# Patient Record
Sex: FEMALE | Race: WHITE
[De-identification: names, ages, dates, MRNs, and addresses within clinical notes are randomized per-mention and may not be internally consistent; named-entity substitution may affect disease eponyms.]

---

## 2008-10-27 VITALS
DIASTOLIC BLOOD PRESSURE: 76 MMHG | SYSTOLIC BLOOD PRESSURE: 100 MMHG | SYSTOLIC BLOOD PRESSURE: 100 MMHG | SYSTOLIC BLOOD PRESSURE: 152.4 MMHG | DIASTOLIC BLOOD PRESSURE: 76 MMHG

## 2018-02-27 ENCOUNTER — HOSPITAL ENCOUNTER (OUTPATIENT)
Dept: HOSPITAL 6 - RAD | Age: 68
End: 2018-02-27
Payer: MEDICARE

## 2018-02-27 DIAGNOSIS — M25.775: ICD-10-CM

## 2018-02-27 DIAGNOSIS — M20.12: Primary | ICD-10-CM

## 2019-04-18 ENCOUNTER — HOSPITAL ENCOUNTER (EMERGENCY)
Dept: HOSPITAL 19 - COL.ER | Age: 69
Discharge: HOME | End: 2019-04-18
Payer: MEDICARE

## 2019-04-18 VITALS — DIASTOLIC BLOOD PRESSURE: 99 MMHG | SYSTOLIC BLOOD PRESSURE: 129 MMHG | HEART RATE: 88 BPM

## 2019-04-18 VITALS — WEIGHT: 118.17 LBS | BODY MASS INDEX: 23.2 KG/M2 | HEIGHT: 60 IN

## 2019-04-18 VITALS — TEMPERATURE: 97.9 F

## 2019-04-18 DIAGNOSIS — M48.56XG: ICD-10-CM

## 2019-04-18 DIAGNOSIS — M06.9: ICD-10-CM

## 2019-04-18 DIAGNOSIS — N12: Primary | ICD-10-CM

## 2019-04-18 DIAGNOSIS — Z98.890: ICD-10-CM

## 2019-04-18 DIAGNOSIS — Z79.51: ICD-10-CM

## 2019-04-18 LAB
ALBUMIN SERPL-MCNC: 4 GM/DL (ref 3.5–5)
ALP SERPL-CCNC: 123 U/L (ref 50–136)
ALT SERPL-CCNC: 13 U/L (ref 9–52)
ANION GAP SERPL CALC-SCNC: 9 MMOL/L (ref 7–16)
AST SERPL-CCNC: 44 U/L (ref 15–37)
BASOPHILS # BLD: 0.1 10*3/UL (ref 0–0.2)
BASOPHILS NFR BLD AUTO: 1.2 % (ref 0–2)
BILIRUB SERPL-MCNC: 0.4 MG/DL (ref 0–1)
BUN SERPL-MCNC: 18 MG/DL (ref 7–17)
CALCIUM SERPL-MCNC: 8.9 MG/DL (ref 8.4–10.2)
CHLORIDE SERPL-SCNC: 98 MMOL/L (ref 98–107)
CO2 SERPL-SCNC: 30 MMOL/L (ref 22–30)
CREAT SERPL-SCNC: 0.58 UMOL/L (ref 0.52–1.25)
EOSINOPHIL # BLD: 0.2 10*3/UL (ref 0–0.7)
EOSINOPHIL NFR BLD: 4 % (ref 0–4)
ERYTHROCYTE [DISTWIDTH] IN BLOOD BY AUTOMATED COUNT: 15.9 % (ref 11.5–14.5)
GLUCOSE SERPL-MCNC: 84 MG/DL (ref 74–106)
GRANULOCYTES # BLD AUTO: 55.2 % (ref 42.2–75.2)
HCT VFR BLD AUTO: 42.8 % (ref 37–47)
HGB BLD-MCNC: 13.7 G/DL (ref 12.5–16)
LIPASE SERPL-CCNC: 40 U/L (ref 23–300)
LYMPHOCYTES # BLD: 1.3 10*3/UL (ref 1.2–3.4)
LYMPHOCYTES NFR BLD: 26 % (ref 20–51)
MCH RBC QN AUTO: 27 PG (ref 27–31)
MCHC RBC AUTO-ENTMCNC: 32 G/DL (ref 33–37)
MCV RBC AUTO: 84 FL (ref 80–100)
MONOCYTES # BLD: 0.7 10*3/UL (ref 0.1–0.6)
MONOCYTES NFR BLD AUTO: 13.4 % (ref 1.7–9.3)
NEUTROPHILS # BLD: 2.8 10*3/UL (ref 1.4–6.5)
PH UR STRIP.AUTO: 5 [PH] (ref 5–8)
PLATELET # BLD AUTO: 312 K/MM3 (ref 130–400)
PMV BLD AUTO: 9.2 FL (ref 7.4–10.4)
POTASSIUM SERPL-SCNC: 3.1 MMOL/L (ref 3.4–5)
PROT SERPL-MCNC: 7.9 GM/DL (ref 6.4–8.2)
RBC # BLD AUTO: 5.1 M/MM3 (ref 4.1–5.3)
RBC # UR: (no result) /HPF
SODIUM SERPL-SCNC: 137 MMOL/L (ref 137–145)
SP GR UR STRIP.AUTO: 1.01 (ref 1–1.03)
SQUAMOUS # URNS: (no result) /HPF
URINE LEUKOCYTE ESTERASE: (no result)
URN COLLECT METHOD CLASS: (no result)

## 2019-04-18 PROCEDURE — A4216 STERILE WATER/SALINE, 10 ML: HCPCS

## 2019-05-06 ENCOUNTER — HOSPITAL ENCOUNTER (EMERGENCY)
Dept: HOSPITAL 19 - COL.ER | Age: 69
Discharge: HOME | End: 2019-05-06
Payer: MEDICARE

## 2019-05-06 VITALS — HEART RATE: 92 BPM | SYSTOLIC BLOOD PRESSURE: 130 MMHG | DIASTOLIC BLOOD PRESSURE: 93 MMHG

## 2019-05-06 VITALS — BODY MASS INDEX: 22.81 KG/M2 | WEIGHT: 116.18 LBS | HEIGHT: 60 IN

## 2019-05-06 VITALS — TEMPERATURE: 97 F

## 2019-05-06 DIAGNOSIS — N39.0: Primary | ICD-10-CM

## 2019-05-06 DIAGNOSIS — I10: ICD-10-CM

## 2019-05-06 LAB
ALBUMIN SERPL-MCNC: 4.1 GM/DL (ref 3.5–5)
ALP SERPL-CCNC: 130 U/L (ref 50–136)
ALT SERPL-CCNC: < 6 U/L (ref 9–52)
ANION GAP SERPL CALC-SCNC: 15 MMOL/L (ref 7–16)
AST SERPL-CCNC: 51 U/L (ref 15–37)
BASOPHILS # BLD: 0.1 10*3/UL (ref 0–0.2)
BASOPHILS NFR BLD AUTO: 1.1 % (ref 0–2)
BILIRUB SERPL-MCNC: 0.4 MG/DL (ref 0–1)
BUN SERPL-MCNC: 18 MG/DL (ref 7–17)
CALCIUM SERPL-MCNC: 9.3 MG/DL (ref 8.4–10.2)
CHLORIDE SERPL-SCNC: 100 MMOL/L (ref 98–107)
CO2 SERPL-SCNC: 28 MMOL/L (ref 22–30)
CREAT SERPL-SCNC: 0.58 UMOL/L (ref 0.52–1.25)
EOSINOPHIL # BLD: 0.1 10*3/UL (ref 0–0.7)
EOSINOPHIL NFR BLD: 1.5 % (ref 0–4)
ERYTHROCYTE [DISTWIDTH] IN BLOOD BY AUTOMATED COUNT: 15.9 % (ref 11.5–14.5)
GLUCOSE SERPL-MCNC: 96 MG/DL (ref 74–106)
GRANULOCYTES # BLD AUTO: 56.6 % (ref 42.2–75.2)
HCT VFR BLD AUTO: 45.7 % (ref 37–47)
HGB BLD-MCNC: 14.8 G/DL (ref 12.5–16)
HYALINE CASTS #/AREA URNS LPF: (no result) /LPF
KETONES UR STRIP.AUTO-MCNC: (no result) MG/DL
LYMPHOCYTES # BLD: 1.7 10*3/UL (ref 1.2–3.4)
LYMPHOCYTES NFR BLD: 26.6 % (ref 20–51)
MCH RBC QN AUTO: 27 PG (ref 27–31)
MCHC RBC AUTO-ENTMCNC: 32 G/DL (ref 33–37)
MCV RBC AUTO: 84 FL (ref 80–100)
MONOCYTES # BLD: 0.9 10*3/UL (ref 0.1–0.6)
MONOCYTES NFR BLD AUTO: 13.9 % (ref 1.7–9.3)
NEUTROPHILS # BLD: 3.7 10*3/UL (ref 1.4–6.5)
PH UR STRIP.AUTO: 5 [PH] (ref 5–8)
PLATELET # BLD AUTO: 420 K/MM3 (ref 130–400)
PMV BLD AUTO: 9.4 FL (ref 7.4–10.4)
POTASSIUM SERPL-SCNC: 3 MMOL/L (ref 3.4–5)
PROT SERPL-MCNC: 8.4 GM/DL (ref 6.4–8.2)
RBC # BLD AUTO: 5.46 M/MM3 (ref 4.1–5.3)
RBC # UR: (no result) /HPF
SODIUM SERPL-SCNC: 143 MMOL/L (ref 137–145)
SP GR UR STRIP.AUTO: 1.01 (ref 1–1.03)
SQUAMOUS # URNS: (no result) /HPF
URINE LEUKOCYTE ESTERASE: (no result)
URN COLLECT METHOD CLASS: (no result)

## 2020-01-17 ENCOUNTER — HOSPITAL ENCOUNTER (OUTPATIENT)
Dept: HOSPITAL 19 - ZCOL.LAB | Age: 70
End: 2020-01-17
Attending: NURSE PRACTITIONER
Payer: MEDICARE

## 2020-01-17 DIAGNOSIS — S81.831A: Primary | ICD-10-CM

## 2020-02-28 ENCOUNTER — HOSPITAL ENCOUNTER (OUTPATIENT)
Dept: HOSPITAL 19 - ZCOL.LAB | Age: 70
End: 2020-02-28
Attending: NURSE PRACTITIONER
Payer: MEDICARE

## 2020-02-28 DIAGNOSIS — L53.9: Primary | ICD-10-CM

## 2020-06-26 ENCOUNTER — HOSPITAL ENCOUNTER (OUTPATIENT)
Dept: HOSPITAL 19 - ZCOL.LAB | Age: 70
End: 2020-06-26
Attending: NURSE PRACTITIONER
Payer: MEDICARE
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
## 2020-06-26 DIAGNOSIS — L03.90: Primary | ICD-10-CM

## 2020-09-18 ENCOUNTER — HOSPITAL ENCOUNTER (OUTPATIENT)
Dept: HOSPITAL 19 - ZCOL.LAB | Age: 70
End: 2020-09-18
Attending: NURSE PRACTITIONER
Payer: MEDICARE

## 2020-09-18 DIAGNOSIS — S81.831A: Primary | ICD-10-CM

## 2020-09-21 ENCOUNTER — HOSPITAL ENCOUNTER (OUTPATIENT)
Dept: HOSPITAL 19 - COL.LAB | Age: 70
End: 2020-09-21
Attending: INTERNAL MEDICINE
Payer: MEDICARE

## 2020-09-21 DIAGNOSIS — L97.909: Primary | ICD-10-CM

## 2020-09-21 DIAGNOSIS — T14.8XXD: ICD-10-CM

## 2020-09-21 DIAGNOSIS — S81.831A: ICD-10-CM

## 2020-09-21 DIAGNOSIS — L03.90: ICD-10-CM

## 2020-09-21 DIAGNOSIS — S91.051A: ICD-10-CM

## 2020-09-21 LAB
ALBUMIN SERPL-MCNC: 4.3 GM/DL (ref 3.5–5)
ALP SERPL-CCNC: 112 U/L (ref 50–136)
ALT SERPL-CCNC: 16 U/L (ref 4–34)
ANION GAP SERPL CALC-SCNC: 6 MMOL/L (ref 7–16)
AST SERPL-CCNC: 42 U/L (ref 15–37)
BASOPHILS # BLD: 0 10*3/UL (ref 0–0.2)
BASOPHILS NFR BLD AUTO: 0.5 % (ref 0–2)
BILIRUB SERPL-MCNC: 0.4 MG/DL (ref 0–1)
BUN SERPL-MCNC: 14 MG/DL (ref 7–17)
CALCIUM SERPL-MCNC: 9.5 MG/DL (ref 8.4–10.2)
CHLORIDE SERPL-SCNC: 99 MMOL/L (ref 98–107)
CO2 SERPL-SCNC: 34 MMOL/L (ref 22–30)
CREAT SERPL-SCNC: 0.63 UMOL/L (ref 0.52–1.25)
CRP SERPL-MCNC: 1.6 MG/DL (ref 0–0.9)
EOSINOPHIL # BLD: 0.2 10*3/UL (ref 0–0.7)
EOSINOPHIL NFR BLD: 2 % (ref 0–4)
ERYTHROCYTE [DISTWIDTH] IN BLOOD BY AUTOMATED COUNT: 15.7 % (ref 11.5–14.5)
ERYTHROCYTE [SEDIMENTATION RATE] IN BLOOD: 14 MM/HR (ref 0–30)
GLUCOSE SERPL-MCNC: 93 MG/DL (ref 74–106)
GRANULOCYTES # BLD AUTO: 69.4 % (ref 42.2–75.2)
HCT VFR BLD AUTO: 43 % (ref 37–47)
HGB BLD-MCNC: 13.7 G/DL (ref 12.5–16)
LYMPHOCYTES # BLD: 1.3 10*3/UL (ref 1.2–3.4)
LYMPHOCYTES NFR BLD: 17.3 % (ref 20–51)
MCH RBC QN AUTO: 27 PG (ref 27–31)
MCHC RBC AUTO-ENTMCNC: 32 G/DL (ref 33–37)
MCV RBC AUTO: 85 FL (ref 80–100)
MONOCYTES # BLD: 0.8 10*3/UL (ref 0.1–0.6)
MONOCYTES NFR BLD AUTO: 10.5 % (ref 1.7–9.3)
NEUTROPHILS # BLD: 5.2 10*3/UL (ref 1.4–6.5)
PLATELET # BLD AUTO: 363 K/MM3 (ref 130–400)
PMV BLD AUTO: 9.9 FL (ref 7.4–10.4)
POTASSIUM SERPL-SCNC: 3.1 MMOL/L (ref 3.4–5)
PROT SERPL-MCNC: 8.7 GM/DL (ref 6.4–8.2)
RBC # BLD AUTO: 5.09 M/MM3 (ref 4.1–5.3)
SODIUM SERPL-SCNC: 140 MMOL/L (ref 137–145)

## 2020-12-08 ENCOUNTER — HOSPITAL ENCOUNTER (INPATIENT)
Dept: HOSPITAL 19 - COL.ER | Age: 70
LOS: 4 days | Discharge: NURSING, REHAB OR LONG-TERM CARE | DRG: 481 | End: 2020-12-12
Attending: INTERNAL MEDICINE | Admitting: EMERGENCY MEDICINE
Payer: MEDICARE

## 2020-12-08 VITALS — SYSTOLIC BLOOD PRESSURE: 137 MMHG | DIASTOLIC BLOOD PRESSURE: 83 MMHG | HEART RATE: 83 BPM | TEMPERATURE: 97.6 F

## 2020-12-08 VITALS — WEIGHT: 110.23 LBS | HEIGHT: 60 IN | BODY MASS INDEX: 21.64 KG/M2

## 2020-12-08 VITALS — HEART RATE: 79 BPM | DIASTOLIC BLOOD PRESSURE: 80 MMHG | SYSTOLIC BLOOD PRESSURE: 123 MMHG | TEMPERATURE: 98 F

## 2020-12-08 VITALS — SYSTOLIC BLOOD PRESSURE: 127 MMHG | HEART RATE: 87 BPM | TEMPERATURE: 98.2 F | DIASTOLIC BLOOD PRESSURE: 75 MMHG

## 2020-12-08 VITALS — DIASTOLIC BLOOD PRESSURE: 84 MMHG | SYSTOLIC BLOOD PRESSURE: 121 MMHG | HEART RATE: 93 BPM | TEMPERATURE: 98.4 F

## 2020-12-08 DIAGNOSIS — S72.141A: Primary | ICD-10-CM

## 2020-12-08 DIAGNOSIS — Z96.652: ICD-10-CM

## 2020-12-08 DIAGNOSIS — E87.1: ICD-10-CM

## 2020-12-08 DIAGNOSIS — I10: ICD-10-CM

## 2020-12-08 DIAGNOSIS — M06.9: ICD-10-CM

## 2020-12-08 DIAGNOSIS — B96.5: ICD-10-CM

## 2020-12-08 DIAGNOSIS — Z20.828: ICD-10-CM

## 2020-12-08 DIAGNOSIS — E87.8: ICD-10-CM

## 2020-12-08 DIAGNOSIS — D62: ICD-10-CM

## 2020-12-08 DIAGNOSIS — F05: ICD-10-CM

## 2020-12-08 DIAGNOSIS — E78.5: ICD-10-CM

## 2020-12-08 DIAGNOSIS — D64.9: ICD-10-CM

## 2020-12-08 DIAGNOSIS — E87.6: ICD-10-CM

## 2020-12-08 DIAGNOSIS — R91.1: ICD-10-CM

## 2020-12-08 DIAGNOSIS — L97.929: ICD-10-CM

## 2020-12-08 DIAGNOSIS — E83.42: ICD-10-CM

## 2020-12-08 DIAGNOSIS — J90: ICD-10-CM
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
## 2020-12-08 LAB
ALBUMIN SERPL-MCNC: 3.3 GM/DL (ref 3.5–5)
ALP SERPL-CCNC: 89 U/L (ref 50–136)
ALT SERPL-CCNC: 13 U/L (ref 4–34)
ANION GAP SERPL CALC-SCNC: 6 MMOL/L (ref 7–16)
ANION GAP SERPL CALC-SCNC: 7 MMOL/L (ref 7–16)
ANION GAP SERPL CALC-SCNC: 9 MMOL/L (ref 7–16)
AST SERPL-CCNC: 38 U/L (ref 15–37)
BASOPHILS # BLD: 0 10*3/UL (ref 0–0.2)
BASOPHILS NFR BLD AUTO: 0.6 % (ref 0–2)
BILIRUB SERPL-MCNC: 0.4 MG/DL (ref 0–1)
BUN SERPL-MCNC: 12 MG/DL (ref 7–17)
BUN SERPL-MCNC: 5 MG/DL (ref 7–17)
BUN SERPL-MCNC: 9 MG/DL (ref 7–17)
CALCIUM SERPL-MCNC: 8 MG/DL (ref 8.4–10.2)
CALCIUM SERPL-MCNC: 8.1 MG/DL (ref 8.4–10.2)
CALCIUM SERPL-MCNC: 8.2 MG/DL (ref 8.4–10.2)
CHLORIDE SERPL-SCNC: 88 MMOL/L (ref 98–107)
CHLORIDE SERPL-SCNC: 88 MMOL/L (ref 98–107)
CHLORIDE SERPL-SCNC: 92 MMOL/L (ref 98–107)
CO2 SERPL-SCNC: 29 MMOL/L (ref 22–30)
CO2 SERPL-SCNC: 30 MMOL/L (ref 22–30)
CO2 SERPL-SCNC: 34 MMOL/L (ref 22–30)
CREAT SERPL-SCNC: 0.32 UMOL/L (ref 0.52–1.25)
CREAT SERPL-SCNC: 0.32 UMOL/L (ref 0.52–1.25)
CREAT SERPL-SCNC: 0.38 UMOL/L (ref 0.52–1.25)
EOSINOPHIL # BLD: 0 10*3/UL (ref 0–0.7)
EOSINOPHIL NFR BLD: 0.6 % (ref 0–4)
ERYTHROCYTE [DISTWIDTH] IN BLOOD BY AUTOMATED COUNT: 14.6 % (ref 11.5–14.5)
GLUCOSE SERPL-MCNC: 104 MG/DL (ref 74–106)
GLUCOSE SERPL-MCNC: 106 MG/DL (ref 74–106)
GLUCOSE SERPL-MCNC: 118 MG/DL (ref 74–106)
GRANULOCYTES # BLD AUTO: 72.3 % (ref 42.2–75.2)
HCT VFR BLD AUTO: 35 % (ref 37–47)
HGB BLD-MCNC: 11.6 G/DL (ref 12.5–16)
INR BLD: 1 (ref 0.8–3)
LYMPHOCYTES # BLD: 0.9 10*3/UL (ref 1.2–3.4)
LYMPHOCYTES NFR BLD: 14.3 % (ref 20–51)
MCH RBC QN AUTO: 27 PG (ref 27–31)
MCHC RBC AUTO-ENTMCNC: 33 G/DL (ref 33–37)
MCV RBC AUTO: 80 FL (ref 80–100)
MONOCYTES # BLD: 0.8 10*3/UL (ref 0.1–0.6)
MONOCYTES NFR BLD AUTO: 11.6 % (ref 1.7–9.3)
NEUTROPHILS # BLD: 4.8 10*3/UL (ref 1.4–6.5)
PH UR STRIP.AUTO: 7 [PH] (ref 5–8)
PLATELET # BLD AUTO: 309 K/MM3 (ref 130–400)
PMV BLD AUTO: 9.1 FL (ref 7.4–10.4)
POTASSIUM SERPL-SCNC: 2 MMOL/L (ref 3.4–5)
POTASSIUM SERPL-SCNC: 3 MMOL/L (ref 3.4–5)
POTASSIUM SERPL-SCNC: 3.1 MMOL/L (ref 3.4–5)
PROT SERPL-MCNC: 6.4 GM/DL (ref 6.4–8.2)
PROTHROMBIN TIME: 11.3 SECONDS (ref 9.7–12.8)
RBC # BLD AUTO: 4.36 M/MM3 (ref 4.1–5.3)
RBC # UR STRIP.AUTO: (no result) /UL
SODIUM SERPL-SCNC: 127 MMOL/L (ref 137–145)
SODIUM SERPL-SCNC: 127 MMOL/L (ref 137–145)
SODIUM SERPL-SCNC: 129 MMOL/L (ref 137–145)
SP GR UR STRIP.AUTO: 1.01 (ref 1–1.03)
URN COLLECT METHOD CLASS: (no result)

## 2020-12-08 PROCEDURE — A9284 NON-ELECTRONIC SPIROMETER: HCPCS

## 2020-12-08 PROCEDURE — C1713 ANCHOR/SCREW BN/BN,TIS/BN: HCPCS

## 2020-12-08 NOTE — NUR
ANDRA met with the patient to discuss discharge plan. The patient lives in
[place] with her , Kiet (ph#[phone]). She reports independence
with ADLs before the fall and has a cane. The patient's primary care provider
is Captain Reynolds at [facility] and she also receives her
medications there. The patient does not have a DPOA-HC in EMR, but she states
that she does have one completed and that her  is her DPOA-HC. The
patient has a right hip fracture. SW discussed post-acute rehab. The patient
states that she would prefer to discuss this later, but is agreeable to SW
sending referrals to the local facilities. ANDRA then contacted and reviewed the
above information with the patient's , Kiet. Kiet is agreeable to
post-acute rehab and would prefer a facility in [place]. He states that
he will talk to the patient about the facilities and decide what their
preference is. He was agreeable for SW to send referrals. ANDAR consulted IPR
Director, Savita. ANDRA contacted and faxed a referral to [facility].
Awaiting screens.

## 2020-12-08 NOTE — NUR
Vancomycin Initial Dosing Pharmacy Note
69 YO F
Indication/duration: NON-HEALING LLE ULCER W/NEW L HIP FX (NO REPORTED CONCERN
FOR OSTEO
Trough goal: 10-20
DOSING HX: NO REPORTED VANC TROUGH TO CORRELATE WITH PREVIOUS DOSING
 
BMI: 21.5
WT: 50 KG
SCR: 0.38
ESTCRCL ~110 ML/MIN
T 1/2 ~ 7 H
 
TMAX:98.9
WBC:6.6
WOUND CX - PENDING
NO IMAGING AVAILABLE
 
PT LOADED WITH VANCO 1GM X1. PT UNLIKELY TO FOLLOW POPULATION BASED KINETICS
SO WILL START A MAINTENANCE DOSE OF 750MG Q18H, AS PT LIKELY TO HAVE SLOWER
CLEARANCE. WILL MONITOR RENAL FUNCTION, LEVELS, AND MICRO FOR NEED TO ADJUST
THERAPY. THANK YOU FOR THIS DOSING CONSULT!

## 2020-12-08 NOTE — NUR
Pt arrived to medical unit room 311 from ED via stretcher at 0500. Oriented to
room, admission assessments and med rec completed. Pt reports 10/10 pain to
right hip, IV morphine administered with minimal relief. Ice pack applied to
right hip. Declines other interventions at this time. Heart RRR, lungs CTA,
A&Ox4. Pt with IVs to bilateral forearms. Magnesium, potassium, NS, and
Vancomycin infusing at this time. Farmer in place with clear yellow output.
Call light in reach.

## 2020-12-08 NOTE — NUR
Received report from Faheem. Seen patient awake, lying in bed. She is alert
and oriented. With IV on left AC infusing NS at 100ml/hr and Potassium
Chloride as piggyback. With INT on right AC. She's on room air. With jeter
catheter draining clear, yellow urine. Call light within reach.

## 2020-12-08 NOTE — NUR
Patient complains of pain on her IV site with Potassium infusing. Checked IV
site, with good back flow. Stopped Potassium for awhile. Checked on her INT on
left AC to see if I can transfer her fluids and Potassium but IV site is noted
to have redness already. Removed IV site on left AC. Elayne PALMA tried to insert a
new IV site. She tried twice and was able to insert on her right wrist.
Decreased the rate of Potassium so it won't be that painful to her. Non-compliant with treatment

## 2020-12-09 VITALS — HEART RATE: 88 BPM | DIASTOLIC BLOOD PRESSURE: 87 MMHG | SYSTOLIC BLOOD PRESSURE: 128 MMHG

## 2020-12-09 VITALS — DIASTOLIC BLOOD PRESSURE: 84 MMHG | SYSTOLIC BLOOD PRESSURE: 128 MMHG | HEART RATE: 80 BPM

## 2020-12-09 VITALS — SYSTOLIC BLOOD PRESSURE: 133 MMHG | TEMPERATURE: 97.9 F | HEART RATE: 88 BPM | DIASTOLIC BLOOD PRESSURE: 85 MMHG

## 2020-12-09 VITALS — TEMPERATURE: 97.9 F | HEART RATE: 87 BPM | DIASTOLIC BLOOD PRESSURE: 79 MMHG | SYSTOLIC BLOOD PRESSURE: 128 MMHG

## 2020-12-09 VITALS — DIASTOLIC BLOOD PRESSURE: 76 MMHG | TEMPERATURE: 97.9 F | SYSTOLIC BLOOD PRESSURE: 138 MMHG | HEART RATE: 92 BPM

## 2020-12-09 VITALS — TEMPERATURE: 97.7 F | DIASTOLIC BLOOD PRESSURE: 66 MMHG | HEART RATE: 97 BPM | SYSTOLIC BLOOD PRESSURE: 115 MMHG

## 2020-12-09 VITALS — HEART RATE: 98 BPM | TEMPERATURE: 97.6 F | SYSTOLIC BLOOD PRESSURE: 124 MMHG | DIASTOLIC BLOOD PRESSURE: 82 MMHG

## 2020-12-09 VITALS — TEMPERATURE: 97.7 F | SYSTOLIC BLOOD PRESSURE: 121 MMHG | DIASTOLIC BLOOD PRESSURE: 74 MMHG | HEART RATE: 90 BPM

## 2020-12-09 VITALS — HEART RATE: 80 BPM | DIASTOLIC BLOOD PRESSURE: 87 MMHG | SYSTOLIC BLOOD PRESSURE: 128 MMHG

## 2020-12-09 VITALS — DIASTOLIC BLOOD PRESSURE: 74 MMHG | SYSTOLIC BLOOD PRESSURE: 141 MMHG | HEART RATE: 78 BPM

## 2020-12-09 VITALS — TEMPERATURE: 97.9 F | SYSTOLIC BLOOD PRESSURE: 134 MMHG | DIASTOLIC BLOOD PRESSURE: 82 MMHG | HEART RATE: 87 BPM

## 2020-12-09 VITALS — SYSTOLIC BLOOD PRESSURE: 112 MMHG | HEART RATE: 100 BPM | TEMPERATURE: 98.1 F | DIASTOLIC BLOOD PRESSURE: 75 MMHG

## 2020-12-09 VITALS — SYSTOLIC BLOOD PRESSURE: 131 MMHG | HEART RATE: 84 BPM | TEMPERATURE: 97.6 F | DIASTOLIC BLOOD PRESSURE: 88 MMHG

## 2020-12-09 VITALS — DIASTOLIC BLOOD PRESSURE: 77 MMHG | SYSTOLIC BLOOD PRESSURE: 148 MMHG | HEART RATE: 90 BPM

## 2020-12-09 LAB
ANION GAP SERPL CALC-SCNC: 7 MMOL/L (ref 7–16)
BUN SERPL-MCNC: 4 MG/DL (ref 7–17)
CALCIUM SERPL-MCNC: 8.3 MG/DL (ref 8.4–10.2)
CHLORIDE SERPL-SCNC: 98 MMOL/L (ref 98–107)
CO2 SERPL-SCNC: 24 MMOL/L (ref 22–30)
CREAT SERPL-SCNC: 0.31 UMOL/L (ref 0.52–1.25)
GLUCOSE SERPL-MCNC: 77 MG/DL (ref 74–106)
POTASSIUM SERPL-SCNC: 4.1 MMOL/L (ref 3.4–5)
SODIUM SERPL-SCNC: 130 MMOL/L (ref 137–145)

## 2020-12-09 PROCEDURE — 0QS636Z REPOSITION RIGHT UPPER FEMUR WITH INTRAMEDULLARY INTERNAL FIXATION DEVICE, PERCUTANEOUS APPROACH: ICD-10-PCS | Performed by: GENERAL PRACTICE

## 2020-12-09 NOTE — NUR
Follow-up; Patient having surgical procedure this morning.  offered
encouragement, prayer for a successful surgical procedure and rapid and
thorough healing.

## 2020-12-09 NOTE — NUR
Resting in bed. Assessment complete. Lungs clear. Heart sounds normal. Bowels
active x4. Pulses present throughout. No edema noted. INT left AC without
complications. IV left wrist infusing NS without complications. Right hip
incision sites x3 covered with gauze and drainage to distal dressing. Ice
applied. Farmer catheter in place to dependent drainage with clear yellow
urine. Patient tearful wanting to see . Educated patient regarding
visitors policy. Patient voiced unhappiness regarding policy. Denies other
needs at this time. Given scheduled norco for 6/10 back pain. Call light in
reach. Bed alarm in place.

## 2020-12-09 NOTE — NUR
Patient complains of right hip pain this morning. Morphine given. Ice pack
placed on her right hip. Changed patient's clothes to gown. Necklace was
removed and was placed in a ziplock and put inside the patient's plastic bag.
Patient aware. Oral swab provided as patient cannot have water and her lips
are dry.

## 2020-12-09 NOTE — NUR
Patient drowsy, arouses to verbal stimuli.  See assessment.  RLE with drainage
noted to dressing.  Pulses palpable to RLE, limited ROM.  Ice pack in place to
RLE.  No c/o at this time.

## 2020-12-09 NOTE — NUR
Vic, at [facility], reports that they are able to accept the patient for a
skilled stay.

## 2020-12-09 NOTE — NUR
Patient transferred from PACU at this time.  Report received per PACU.  This
nurse assumes responsibility of patient at 1105.

## 2020-12-10 VITALS — HEART RATE: 92 BPM | SYSTOLIC BLOOD PRESSURE: 111 MMHG | TEMPERATURE: 98.1 F | DIASTOLIC BLOOD PRESSURE: 73 MMHG

## 2020-12-10 VITALS — TEMPERATURE: 98.4 F | DIASTOLIC BLOOD PRESSURE: 67 MMHG | SYSTOLIC BLOOD PRESSURE: 130 MMHG | HEART RATE: 100 BPM

## 2020-12-10 VITALS — DIASTOLIC BLOOD PRESSURE: 71 MMHG | TEMPERATURE: 97.9 F | HEART RATE: 102 BPM | SYSTOLIC BLOOD PRESSURE: 144 MMHG

## 2020-12-10 VITALS — SYSTOLIC BLOOD PRESSURE: 123 MMHG | TEMPERATURE: 97.6 F | DIASTOLIC BLOOD PRESSURE: 70 MMHG | HEART RATE: 95 BPM

## 2020-12-10 VITALS — DIASTOLIC BLOOD PRESSURE: 72 MMHG | TEMPERATURE: 98.1 F | SYSTOLIC BLOOD PRESSURE: 114 MMHG | HEART RATE: 92 BPM

## 2020-12-10 LAB
ANION GAP SERPL CALC-SCNC: 7 MMOL/L (ref 7–16)
BASOPHILS # BLD: 0 10*3/UL (ref 0–0.2)
BASOPHILS NFR BLD AUTO: 0 % (ref 0–2)
BUN SERPL-MCNC: 10 MG/DL (ref 7–17)
CALCIUM SERPL-MCNC: 7.8 MG/DL (ref 8.4–10.2)
CHLORIDE SERPL-SCNC: 93 MMOL/L (ref 98–107)
CO2 SERPL-SCNC: 26 MMOL/L (ref 22–30)
CREAT SERPL-SCNC: 0.34 UMOL/L (ref 0.52–1.25)
EOSINOPHIL # BLD: 0 10*3/UL (ref 0–0.7)
EOSINOPHIL NFR BLD: 0 % (ref 0–4)
ERYTHROCYTE [DISTWIDTH] IN BLOOD BY AUTOMATED COUNT: 15 % (ref 11.5–14.5)
GLUCOSE SERPL-MCNC: 98 MG/DL (ref 74–106)
GRANULOCYTES # BLD AUTO: 84.2 % (ref 42.2–75.2)
HCT VFR BLD AUTO: 29.8 % (ref 37–47)
HGB BLD-MCNC: 9.8 G/DL (ref 12.5–16)
LYMPHOCYTES # BLD: 0.6 10*3/UL (ref 1.2–3.4)
LYMPHOCYTES NFR BLD: 8.4 % (ref 20–51)
MAGNESIUM SERPL-MCNC: 2 MG/DL (ref 1.6–2.3)
MCH RBC QN AUTO: 27 PG (ref 27–31)
MCHC RBC AUTO-ENTMCNC: 33 G/DL (ref 33–37)
MCV RBC AUTO: 83 FL (ref 80–100)
MONOCYTES # BLD: 0.5 10*3/UL (ref 0.1–0.6)
MONOCYTES NFR BLD AUTO: 6.8 % (ref 1.7–9.3)
NEUTROPHILS # BLD: 5.5 10*3/UL (ref 1.4–6.5)
PLATELET # BLD AUTO: 277 K/MM3 (ref 130–400)
PMV BLD AUTO: 9.6 FL (ref 7.4–10.4)
POTASSIUM SERPL-SCNC: 3.2 MMOL/L (ref 3.4–5)
RBC # BLD AUTO: 3.59 M/MM3 (ref 4.1–5.3)
SODIUM SERPL-SCNC: 127 MMOL/L (ref 137–145)

## 2020-12-10 NOTE — NUR
PATIENT UP TO CHAIR WITH THERAPY IN THE MORNING. PATIENT A&OX4 BUT SOME
CONVERSATION IS CONFUSED. POSTIVIE PEDAL PULSES EQUAL BILATERALLY. SCD'S TO
BLE. CAP REFILL <3 SECONDS. CMS INTACT. PATIENT YET TO VOID POST SCHULTE
CATHETER REMOVAL. IV TO INT PER ORDERS. PATIENT REPOSITIONED AS NEEDED DURING
SHIFT. WILL REPORT OFF TO ONCOMING NURSE.

## 2020-12-10 NOTE — NUR
Follow-up visit; Patient thanked  for looking in on her again this
morning following her surgical procedure.  wished her a good day and
let her know  is keeping her in 's prayers.

## 2020-12-10 NOTE — NUR
PATIENT VOIDING POST-SCHULTE REMOVAL. BED ALARM ON. PATIENT HAS INCREASED
CONFUSION TONIGHT. PATIENT YELLS OUT IN PAIN WHEN TRANSFERRED OR LEG IS MOVED.
CALL LIGHT IN REACH.

## 2020-12-10 NOTE — NUR
THIS NURSE SPOKE WITH NURSE ISRAEL AT [facility]. WE DO NOT HAVE
SUPPLIES AVAILABLE THAT THEY USE ON THE PATIENTS WOUND. NURSE TO STOP BY AND
PROVIDE DRESSING CHANGE AS THE PROVIDER IS OUT OF THE OFFICE.

## 2020-12-10 NOTE — NUR
PATIENTS SCHULTE CAHTETER REMOVED PER DOCTOR ORDERS. 9 MLS OF STERILE WATER
ASPIRATED FROM BALLOON. TIP INTACT. PATIENT TOLERATED WELL. RIGHT AC INT
DISCONTINUED DUE TO LEAKING. CALL LIGHT IN REACH. PATIENT DENIES ANY OTHER
NEEDS AT THIS TIME.

## 2020-12-10 NOTE — NUR
RLE DRESSING CHANGED BY WOUND CARE NURSE ELAINE ISRAEL. WOUND CARE NURSE REPORTS
THAT THE PATIENT IS MORE CONFUSED THAN HER BASELINE. WILL CONTINUE TO MONITOR.

## 2020-12-10 NOTE — NUR
PT TRYING TO GET OUT OF BED DURING BEDSIDE SHIFT REPORT.  PT CONFUSED. UNSAFE
IN ROOM 349.  MOVED TO ROOM 344 BY NURSES DESK.  PT IS HIGH FALL RISK..  VSS.
2:1 PIVOT TRANSFER TO BS. VOIDED LG AMT CLEAR YELLOW URINE. 1ST VOID SINCE
ERIS CABRERA'YANELI AT 1500.  BACK TO BED. BED ALARM SET. CALL LIGHTIN REACH.

## 2020-12-10 NOTE — NUR
Patient required x1 dose of oxycodone during the night for pain control with
schedule norco. Otherwise uneventful night. Resting in bed this Am. call light
in reach.

## 2020-12-10 NOTE — NUR
collaborated with Savita, IPR Director who met with patient today
and advised patient has still not decided which rehab facility she would like
to go to. Savita advised she would likely be able to accept referral. [facility] also accepted. Savita advised patient is going to
discuss the decision further with her . ANDRA contacted patient's ,
Kiet who advised that he would likely prefer [facility] at this time as he can
schedule visits there. Kiet states it will ultimately be up to the patient.
ANDRA contacted Vic at [facility] and faxed clinical updates. Vic advised he has
been in contact with Kiet. ANDRA will continue to follow.

## 2020-12-11 VITALS — DIASTOLIC BLOOD PRESSURE: 74 MMHG | HEART RATE: 114 BPM | SYSTOLIC BLOOD PRESSURE: 140 MMHG | TEMPERATURE: 97.5 F

## 2020-12-11 VITALS — SYSTOLIC BLOOD PRESSURE: 122 MMHG | HEART RATE: 89 BPM | TEMPERATURE: 97.5 F | DIASTOLIC BLOOD PRESSURE: 75 MMHG

## 2020-12-11 VITALS — SYSTOLIC BLOOD PRESSURE: 128 MMHG | HEART RATE: 89 BPM | DIASTOLIC BLOOD PRESSURE: 73 MMHG | TEMPERATURE: 97.5 F

## 2020-12-11 VITALS — HEART RATE: 100 BPM | SYSTOLIC BLOOD PRESSURE: 99 MMHG | TEMPERATURE: 98.2 F | DIASTOLIC BLOOD PRESSURE: 83 MMHG

## 2020-12-11 VITALS — HEART RATE: 111 BPM | TEMPERATURE: 98 F | DIASTOLIC BLOOD PRESSURE: 77 MMHG | SYSTOLIC BLOOD PRESSURE: 129 MMHG

## 2020-12-11 VITALS — TEMPERATURE: 98.5 F | SYSTOLIC BLOOD PRESSURE: 129 MMHG | DIASTOLIC BLOOD PRESSURE: 77 MMHG | HEART RATE: 101 BPM

## 2020-12-11 VITALS — SYSTOLIC BLOOD PRESSURE: 116 MMHG | DIASTOLIC BLOOD PRESSURE: 75 MMHG | HEART RATE: 101 BPM

## 2020-12-11 LAB
ANION GAP SERPL CALC-SCNC: 3 MMOL/L (ref 7–16)
BUN SERPL-MCNC: 8 MG/DL (ref 7–17)
CALCIUM SERPL-MCNC: 7.9 MG/DL (ref 8.4–10.2)
CHLORIDE SERPL-SCNC: 93 MMOL/L (ref 98–107)
CO2 SERPL-SCNC: 31 MMOL/L (ref 22–30)
CREAT SERPL-SCNC: 0.31 UMOL/L (ref 0.52–1.25)
GLUCOSE SERPL-MCNC: 92 MG/DL (ref 74–106)
HCT VFR BLD AUTO: 26 % (ref 37–47)
HGB BLD-MCNC: 8.6 G/DL (ref 12.5–16)
MAGNESIUM SERPL-MCNC: 1.8 MG/DL (ref 1.6–2.3)
POTASSIUM SERPL-SCNC: 3.4 MMOL/L (ref 3.4–5)
SODIUM SERPL-SCNC: 127 MMOL/L (ref 137–145)

## 2020-12-11 NOTE — NUR
Pt is currently resting in bed. Pt was changed due to have some incontinence
of BM. Pt was able to help with rolling from side to side. Pt has her call
light within reach and her bed is in lowet position.

## 2020-12-11 NOTE — NUR
PATIENT AM MEDICATIONS GIVEN. SHIFT ASSESSMENT COMPLETE. SKIN TEAR TO RIGHT
HIP NEXT TO PROXIMAL AND MIDDLE TEGADERM DRESSINGS NOTED. NEW SHADING PRESENT
ON MIDDLE DRESSING.  ENTERED ROOM AND NOTIFIED OF NEW SKIN TEAR AND
DRESSING SHADING. DRESSINGS REMOVED AND REPLACE WITH MULTIPLE NON-ADHERANT
GAUZE PADS AND PAPER TAPE. PHYSICAL THERAPY ENTERED ROOM AND ASSISTED THE
PATIENT TO THE COMMODE WITH THIS NURSE AND THEN TO THE BEDSIDE CHAIR. CALL
LIGHT WITHIN REACH. LEG ELEVATED WITH PILLOW PATIENT DENIES NEEDS AT THIS
TIME.

## 2020-12-11 NOTE — NUR
PATIENT HAD REQUESTED TO USE THE BEDPAN. WHEN CNA'S CAME IN TO ASSIST PATIENT
THEY NOTIFIED THIS NURSE OF BLOOD COMING THROUGH THE PATIENTS RIGHT HIP
DRESSING.  CALLED AND NOTIFIED THAT THE GAUZE AND TAPE DRESSING IS
SATURATED. TORB TO TEAR DOWN DRESSING. APPLY STERI-STRIPS AND RE-DRESS THE
RIGHT HIP, REINFORCE DRESSING AS NEEDED FROM  TO THIS NURSE.
STERISTRIPS APPLIED, PACK OF 4X4 GAUZE APPLIED TO HIP AND COVERED WITH HYPAFIX
TAPE. WILL CONTINUE TO MONITOR.

## 2020-12-11 NOTE — NUR
met with patient this morning and patient seemed to think she
had already been transferred to a rehab facility. SW asked patient about her
prefences about rehab and patient states her first preference is [facility]
[place]. ANDRA collaborated with BÁRBARA Garcia who advised patient not
ready for discharge today. Patient had confusion over night and had to be
moved closer to nurses station. ANDRA collaborated with LOUIE Denney throughout
the day who advised that patient continued to have confusion. ANDRA contacted
[facility] and faxed clinical updates. Second COVID test was ordered as first
negative will be outside of 72 hour window this weekend.

## 2020-12-11 NOTE — NUR
EVENING MEDICATIONS GIVEN. RIGHT HIP DRESSING CHECKED. MIDDLE PORTION OF
INCISION HAS SOME SHADING PRESENT. UPPER PORTION OF THE DRESSING REINFORCED
WITH TAPE. TAPE COMING OFF DUE TO PULLING BRIEF UP AND DOWN. PATIENT IS
FREQUENTLY REQUESTING TO UTILIZE THE BEDPAN. MORE OFTEN THEN NOT THE PATIENT
IS UNABLE TO URINATE WHEN PLACED ON THE BEDPAN. PATIENT REFUSING THE COMMODE
STATING THAT IT REQUIRES TOO MUCH EFFORT. PATIENT EDUCATED ON EXTERNAL FEMAL
CATHETER. PURWICK PLACED AT THIS TIME.

## 2020-12-12 VITALS — HEART RATE: 97 BPM | DIASTOLIC BLOOD PRESSURE: 73 MMHG | SYSTOLIC BLOOD PRESSURE: 119 MMHG | TEMPERATURE: 98.4 F

## 2020-12-12 VITALS — SYSTOLIC BLOOD PRESSURE: 130 MMHG | TEMPERATURE: 98.1 F | DIASTOLIC BLOOD PRESSURE: 84 MMHG | HEART RATE: 114 BPM

## 2020-12-12 VITALS — DIASTOLIC BLOOD PRESSURE: 84 MMHG | TEMPERATURE: 98.1 F | HEART RATE: 114 BPM | SYSTOLIC BLOOD PRESSURE: 130 MMHG

## 2020-12-12 VITALS — TEMPERATURE: 98.1 F | SYSTOLIC BLOOD PRESSURE: 125 MMHG | HEART RATE: 100 BPM | DIASTOLIC BLOOD PRESSURE: 80 MMHG

## 2020-12-12 VITALS — HEART RATE: 101 BPM | TEMPERATURE: 98 F | DIASTOLIC BLOOD PRESSURE: 81 MMHG | SYSTOLIC BLOOD PRESSURE: 113 MMHG

## 2020-12-12 LAB
ANION GAP SERPL CALC-SCNC: 5 MMOL/L (ref 7–16)
BUN SERPL-MCNC: 9 MG/DL (ref 7–17)
CALCIUM SERPL-MCNC: 8.4 MG/DL (ref 8.4–10.2)
CHLORIDE SERPL-SCNC: 92 MMOL/L (ref 98–107)
CO2 SERPL-SCNC: 34 MMOL/L (ref 22–30)
CREAT SERPL-SCNC: 0.33 UMOL/L (ref 0.52–1.25)
ERYTHROCYTE [DISTWIDTH] IN BLOOD BY AUTOMATED COUNT: 15.8 % (ref 11.5–14.5)
GLUCOSE SERPL-MCNC: 85 MG/DL (ref 74–106)
HCT VFR BLD AUTO: 30.9 % (ref 37–47)
HGB BLD-MCNC: 10.3 G/DL (ref 12.5–16)
MCH RBC QN AUTO: 28 PG (ref 27–31)
MCHC RBC AUTO-ENTMCNC: 33 G/DL (ref 33–37)
MCV RBC AUTO: 83 FL (ref 80–100)
PLATELET # BLD AUTO: 361 K/MM3 (ref 130–400)
PMV BLD AUTO: 8.9 FL (ref 7.4–10.4)
POTASSIUM SERPL-SCNC: 3 MMOL/L (ref 3.4–5)
RBC # BLD AUTO: 3.74 M/MM3 (ref 4.1–5.3)
SODIUM SERPL-SCNC: 130 MMOL/L (ref 137–145)

## 2020-12-12 NOTE — NUR
Pt currently resting in bed. Pt was assisted with the bed pan. Pt dressing did
have a small amount of drainage so pt dressing was changed at this time. No
bleeding was noted at this time. Pt tolerated well. Pt has no other request at
this time. Pt was given fresh ice water and ice to her right hip. Pt has her
call light within reach and her bed is in lowest position.

## 2020-12-12 NOTE — NUR
ANDRA informed that patient's COVID test was negative and patient could transfer
to [facility]. ANDRA called [facility] staff to inform of discharge. Staff stated that she
could be picked up at 4pm on 12/12/20. ANDRA villavicencio spouse to inform him of DC. DC
documentation faxed to facility. Nothing further.

## 2020-12-12 NOTE — NUR
Pt woke up and began to scream out someones name. The aide helped her at this
time to use the bed pan. Pt tolerated well. Pt has her call light within reach
and her bed is in lowest position.

## 2020-12-12 NOTE — NUR
Attempted to call report to [facility], no answer.  Patient transferred to [facility] with
transportation staff at 1625.  Paperwork sent.

## 2020-12-15 ENCOUNTER — HOSPITAL ENCOUNTER (OUTPATIENT)
Dept: HOSPITAL 19 - ZLAB.STJ | Age: 70
End: 2020-12-15
Attending: FAMILY MEDICINE
Payer: MEDICARE

## 2020-12-15 DIAGNOSIS — L97.919: ICD-10-CM

## 2020-12-15 DIAGNOSIS — Z47.2: ICD-10-CM

## 2020-12-15 DIAGNOSIS — J90: ICD-10-CM

## 2020-12-15 DIAGNOSIS — S72.141D: Primary | ICD-10-CM

## 2020-12-15 LAB
ALBUMIN SERPL-MCNC: 3.7 GM/DL (ref 3.5–5)
ALP SERPL-CCNC: 99 U/L (ref 50–136)
ALT SERPL-CCNC: 20 U/L (ref 4–34)
ANION GAP SERPL CALC-SCNC: 9 MMOL/L (ref 7–16)
AST SERPL-CCNC: 52 U/L (ref 15–37)
BASOPHILS # BLD: 0.1 10*3/UL (ref 0–0.2)
BASOPHILS NFR BLD AUTO: 0.8 % (ref 0–2)
BILIRUB SERPL-MCNC: 0.9 MG/DL (ref 0–1)
BUN SERPL-MCNC: 11 MG/DL (ref 7–17)
CALCIUM SERPL-MCNC: 9.4 MG/DL (ref 8.4–10.2)
CHLORIDE SERPL-SCNC: 90 MMOL/L (ref 98–107)
CO2 SERPL-SCNC: 33 MMOL/L (ref 22–30)
CREAT SERPL-SCNC: 0.5 UMOL/L (ref 0.52–1.25)
EOSINOPHIL # BLD: 0.2 10*3/UL (ref 0–0.7)
EOSINOPHIL NFR BLD: 2.9 % (ref 0–4)
ERYTHROCYTE [DISTWIDTH] IN BLOOD BY AUTOMATED COUNT: 16 % (ref 11.5–14.5)
GLUCOSE SERPL-MCNC: 122 MG/DL (ref 74–106)
GRANULOCYTES # BLD AUTO: 75.1 % (ref 42.2–75.2)
HCT VFR BLD AUTO: 36.8 % (ref 37–47)
HGB BLD-MCNC: 11.8 G/DL (ref 12.5–16)
LYMPHOCYTES # BLD: 0.7 10*3/UL (ref 1.2–3.4)
LYMPHOCYTES NFR BLD: 9.9 % (ref 20–51)
MCH RBC QN AUTO: 27 PG (ref 27–31)
MCHC RBC AUTO-ENTMCNC: 32 G/DL (ref 33–37)
MCV RBC AUTO: 84 FL (ref 80–100)
MONOCYTES # BLD: 0.8 10*3/UL (ref 0.1–0.6)
MONOCYTES NFR BLD AUTO: 10.6 % (ref 1.7–9.3)
NEUTROPHILS # BLD: 5.5 10*3/UL (ref 1.4–6.5)
PH UR STRIP.AUTO: 7 [PH] (ref 5–8)
PLATELET # BLD AUTO: 558 K/MM3 (ref 130–400)
PMV BLD AUTO: 9.4 FL (ref 7.4–10.4)
POTASSIUM SERPL-SCNC: 3.3 MMOL/L (ref 3.4–5)
PROT SERPL-MCNC: 6.9 GM/DL (ref 6.4–8.2)
RBC # BLD AUTO: 4.38 M/MM3 (ref 4.1–5.3)
RBC # UR: (no result) /HPF
SODIUM SERPL-SCNC: 131 MMOL/L (ref 137–145)
SP GR UR STRIP.AUTO: 1.01 (ref 1–1.03)
URN COLLECT METHOD CLASS: (no result)

## 2021-02-28 ENCOUNTER — HOSPITAL ENCOUNTER (OUTPATIENT)
Dept: HOSPITAL 19 - COL.LAB | Age: 71
End: 2021-02-28
Attending: NURSE PRACTITIONER
Payer: MEDICARE

## 2021-02-28 DIAGNOSIS — S81.801D: Primary | ICD-10-CM

## 2021-12-27 ENCOUNTER — HOSPITAL ENCOUNTER (EMERGENCY)
Dept: HOSPITAL 19 - COL.ER | Age: 71
Discharge: ANOTHER HOSPITAL | End: 2021-12-27
Attending: FAMILY MEDICINE
Payer: MEDICARE

## 2021-12-27 ENCOUNTER — HOSPITAL ENCOUNTER (OUTPATIENT)
Dept: HOSPITAL 6 - MED/SURG | Age: 71
Setting detail: OBSERVATION
LOS: 2 days | Discharge: HOME HEALTH | End: 2021-12-29
Attending: FAMILY MEDICINE | Admitting: NURSE PRACTITIONER
Payer: MEDICARE

## 2021-12-27 VITALS — WEIGHT: 103.84 LBS | BODY MASS INDEX: 22.4 KG/M2 | HEIGHT: 57.01 IN

## 2021-12-27 VITALS — SYSTOLIC BLOOD PRESSURE: 127 MMHG | DIASTOLIC BLOOD PRESSURE: 63 MMHG

## 2021-12-27 VITALS — BODY MASS INDEX: 23.12 KG/M2 | WEIGHT: 107.14 LBS | HEIGHT: 57.01 IN

## 2021-12-27 VITALS — DIASTOLIC BLOOD PRESSURE: 81 MMHG | SYSTOLIC BLOOD PRESSURE: 121 MMHG | HEART RATE: 80 BPM

## 2021-12-27 VITALS — TEMPERATURE: 97.9 F

## 2021-12-27 VITALS — SYSTOLIC BLOOD PRESSURE: 138 MMHG | DIASTOLIC BLOOD PRESSURE: 85 MMHG

## 2021-12-27 DIAGNOSIS — Z89.412: ICD-10-CM

## 2021-12-27 DIAGNOSIS — T84.84XA: Primary | ICD-10-CM

## 2021-12-27 DIAGNOSIS — M25.551: ICD-10-CM

## 2021-12-27 DIAGNOSIS — Z79.899: ICD-10-CM

## 2021-12-27 DIAGNOSIS — M06.9: ICD-10-CM

## 2021-12-27 DIAGNOSIS — E78.5: ICD-10-CM

## 2021-12-27 DIAGNOSIS — Z79.82: ICD-10-CM

## 2021-12-27 DIAGNOSIS — I10: ICD-10-CM

## 2021-12-27 DIAGNOSIS — T84.030A: Primary | ICD-10-CM

## 2021-12-27 DIAGNOSIS — Z90.49: ICD-10-CM

## 2021-12-27 LAB
ALBUMIN SERPL-MCNC: 3.1 G/DL (ref 3.4–4.8)
ALT SERPL-CCNC: 10 U/L (ref 0–55)
APPEARANCE UR: CLEAR
AST SERPL-CCNC: 31 U/L (ref 5–34)
BASOPHILS # BLD: 0.04 K/MM3 (ref 0.02–0.1)
BASOPHILS # BLD: 0.1 K/MM3 (ref 0–0.2)
BASOPHILS NFR BLD AUTO: 0.8 % (ref 0–2)
BILIRUB SERPL-MCNC: 0.2 MG/DL (ref 0.2–1.2)
BILIRUB UR QL STRIP.AUTO: NEGATIVE
CALCIUM SERPL-MCNC: 9.1 MG/DL (ref 8.3–10.5)
CO2 SERPL-SCNC: 25 MMOL/L (ref 23–31)
COLOR UR AUTO: YELLOW
EOSINOPHIL # BLD: 0.1 K/MM3 (ref 0–0.7)
EOSINOPHIL # BLD: 0.11 K/MM3 (ref 0.04–0.4)
EOSINOPHIL NFR BLD: 1.5 % (ref 1–5)
EOSINOPHIL NFR BLD: 1.7 % (ref 0–4)
ERYTHROCYTE [DISTWIDTH] IN BLOOD BY AUTOMATED COUNT: 15.9 % (ref 11.5–14.5)
ERYTHROCYTE [DISTWIDTH] IN BLOOD BY AUTOMATED COUNT: 16.2 % (ref 11.5–14.5)
GLUCOSE SERPL-MCNC: 105 MG/DL (ref 65–105)
GLUCOSE UR QL STRIP.AUTO: NEGATIVE
GRANULOCYTES # BLD AUTO: 77.7 % (ref 42.2–75.2)
HCT VFR BLD AUTO: 42.6 % (ref 37–47)
HCT VFR BLD AUTO: 43 % (ref 37–47)
HGB BLD-MCNC: 13.5 G/DL (ref 12.5–16)
HGB BLD-MCNC: 14.1 G/DL (ref 12.5–16)
KETONES UR QL STRIP.AUTO: NEGATIVE
LEUKOCYTE ESTERASE UR QL STRIP: NEGATIVE
LYMPHOCYTES # BLD: 1 K/MM3 (ref 1.2–3.4)
LYMPHOCYTES # BLD: 1.4 K/MM3 (ref 1.5–4)
LYMPHOCYTES NFR BLD: 12.8 % (ref 20–51)
MCH RBC QN AUTO: 25 PG (ref 27–31)
MCH RBC QN AUTO: 25 PG (ref 27–31)
MCHC RBC AUTO-ENTMCNC: 32 G/DL (ref 33–37)
MCHC RBC AUTO-ENTMCNC: 33 G/DL (ref 33–37)
MCV RBC AUTO: 78 FL (ref 80–100)
MCV RBC AUTO: 79 FL (ref 78–100)
MONOCYTES # BLD: 0.5 K/MM3 (ref 0.1–0.6)
MONOCYTES # BLD: 0.87 K/MM3 (ref 0.2–0.8)
MONOCYTES NFR BLD AUTO: 6.7 % (ref 1.7–9.3)
MUCOUS THREADS URNS QL MICRO: PRESENT
NEUTROPHILS # BLD: 5.14 K/MM3 (ref 1.4–6.5)
NEUTROPHILS # BLD: 5.8 K/MM3 (ref 1.4–6.5)
NITRITE UR QL STRIP: NEGATIVE
PH UR STRIP.AUTO: 5.5 [PH] (ref 5–8)
PLATELET # BLD AUTO: 329 K/MM3 (ref 130–400)
PLATELET # BLD AUTO: 335 K/MM3 (ref 130–400)
PMV BLD AUTO: 9.3 FL (ref 7.4–10.4)
PMV BLD AUTO: 9.5 FL (ref 7.4–10.4)
POTASSIUM SERPL-SCNC: 3.3 MMOL/L (ref 3.5–5.1)
PROT ?TM UR-MCNC: (no result) MG/DL
PROT SERPL-MCNC: 7.5 G/DL (ref 6.2–8.1)
RBC # BLD AUTO: 5.39 M/MM3 (ref 4.1–5.3)
RBC # BLD AUTO: 5.55 M/MM3 (ref 4.1–5.3)
RBC UR QL AUTO: NEGATIVE
SODIUM SERPL-SCNC: 135 MMOL/L (ref 136–145)
UROBILINOGEN UR-MCNC: NORMAL MG/DL
WBC # BLD AUTO: 7.6 K/MM3 (ref 4.8–10.8)
WBC #/AREA URNS HPF: (no result) /HPF (ref 0–3)

## 2021-12-27 PROCEDURE — G0378 HOSPITAL OBSERVATION PER HR: HCPCS

## 2021-12-27 PROCEDURE — A9270 NON-COVERED ITEM OR SERVICE: HCPCS

## 2021-12-27 NOTE — NUR
Report received from Bekah ANG RN. Patient resting supine in bed. A/O x4. Rates
pain 4/10 to hip. States normally around 6-7 with scheduled Norco taken.
Denies SOB or cough. Last BM 12/24. Wants to wait until tomorrow to take any
sort of laxative. INT patent to [facility]. Denies questions, wants or needs. Bed
alarm on. Call light in reach.

## 2021-12-27 NOTE — NUR
CNA in room to apply SCD's. Patient states "your wasting your time, I don't
like them and I'm not wearing them". Educated on risk of DVT and patient
states "I know, but I don't want them" Provider notified.

## 2021-12-28 VITALS — SYSTOLIC BLOOD PRESSURE: 127 MMHG | DIASTOLIC BLOOD PRESSURE: 79 MMHG

## 2021-12-28 VITALS — DIASTOLIC BLOOD PRESSURE: 82 MMHG | SYSTOLIC BLOOD PRESSURE: 140 MMHG

## 2021-12-28 VITALS — SYSTOLIC BLOOD PRESSURE: 125 MMHG | DIASTOLIC BLOOD PRESSURE: 77 MMHG

## 2021-12-28 VITALS — DIASTOLIC BLOOD PRESSURE: 79 MMHG | SYSTOLIC BLOOD PRESSURE: 128 MMHG

## 2021-12-28 VITALS — SYSTOLIC BLOOD PRESSURE: 122 MMHG | DIASTOLIC BLOOD PRESSURE: 76 MMHG

## 2021-12-28 NOTE — NUR
PATIENT RESTING IN BED.  AT BEDSIDE. NURSE NOTIFIED PATIENT BOTTOM
REDDENED, PATIENT REPOSTIONED TO SIDE.  IS SEEKING INFORMATION ABOUT
PLAN FOR POSSIBLE SURGERY ON THURSDAY AT [place]. PATIENT OFFERS NO NEEDS OR
COMPLAINTS AT THIS TIME. BED IN LOWEST LOCKED POSTION, ALRMA ON, CALL LIGHT
WITHIN REACH.

## 2021-12-28 NOTE — NUR
CONTACTED DR. SHLOMO MOORE [facility] PLAN OF CARE FOR PATIENT.
DR. MOORE STATES SURGERY IS NON URGENT AND WILL LIKELY BE SCHEDULED IN THE
FUTURE. PATIENT PAIN IS OUT OF CONTEXT WITH PLACEMENT OF SCREW. DR. MOORE
WOULD LIKE TO SEE PATIENT IN CLINIC NEXT WEEK.

## 2021-12-28 NOTE — NUR
REPORT RECEIVED FROM ELAINE HOUSTON. PATIENT CURRENTLY RESTING IN BED WITH EYES
CLOSED. EASILY AROUSABLE. DENIES NEEDS OR COMPLAINTS AT THIS TIME. BED IN
LOWEST LOCKED POSTION, CALL LIGHT WITHIN REACH.

## 2021-12-28 NOTE — NUR
Hs meds reviewed and given. Patient resting in bed awake and alert. Pleasant.
Bilateral heels reddened and floated on pillow. Uses bedpan with assist and
repositioned up in bed. Snack of pudding given.

## 2021-12-28 NOTE — NUR
PATIENT PLEASENT AND COOPERATIVE WITH CARES. PATIENT STATED BEING MORE
COMFORTABLE LYING ON BACK. THIS NURSE EXPLAINED THE IMPORTANCE OF TURNING TO
RELIVE PRESSURE ON BOTTOM TO PREVENT FURTHER REDDENING OR SORES. PATIENT
VERBILIZED UNDERSTANDING AND AGREED TO TRY. PATIENT CURRENTLY RESTING IN BED
WITH TV ON. CALL LIGHT WITHIN REACH.

## 2021-12-28 NOTE — NUR
PATIENT TO BE DISCHARGED FOR APPIONTMENT WITH DR[facility] [place]

## 2021-12-28 NOTE — NUR
Calls to use bed pan. Pain with movement. Voids 300 ML of clear yellow urine.
Repositioned for comfort.

## 2021-12-29 VITALS — SYSTOLIC BLOOD PRESSURE: 133 MMHG | DIASTOLIC BLOOD PRESSURE: 84 MMHG

## 2021-12-29 VITALS — SYSTOLIC BLOOD PRESSURE: 151 MMHG | DIASTOLIC BLOOD PRESSURE: 96 MMHG

## 2021-12-29 VITALS — DIASTOLIC BLOOD PRESSURE: 76 MMHG | SYSTOLIC BLOOD PRESSURE: 109 MMHG

## 2021-12-29 LAB
BASOPHILS # BLD: 0.11 K/MM3 (ref 0.02–0.1)
CALCIUM SERPL-MCNC: 8.9 MG/DL (ref 8.3–10.5)
CO2 SERPL-SCNC: 25 MMOL/L (ref 23–31)
EOSINOPHIL # BLD: 0.09 K/MM3 (ref 0.04–0.4)
EOSINOPHIL NFR BLD: 1.2 % (ref 1–5)
ERYTHROCYTE [DISTWIDTH] IN BLOOD BY AUTOMATED COUNT: 17.5 % (ref 11.5–14.5)
GLUCOSE SERPL-MCNC: 84 MG/DL (ref 65–105)
HCT VFR BLD AUTO: 45.7 % (ref 37–47)
HGB BLD-MCNC: 13.4 G/DL (ref 12.5–16)
LYMPHOCYTES # BLD: 1.53 K/MM3 (ref 1.5–4)
MCH RBC QN AUTO: 27 PG (ref 27–31)
MCHC RBC AUTO-ENTMCNC: 29 G/DL (ref 33–37)
MCV RBC AUTO: 91 FL (ref 78–100)
MONOCYTES # BLD: 0.52 K/MM3 (ref 0.2–0.8)
NEUTROPHILS # BLD: 4.95 K/MM3 (ref 1.4–6.5)
PLATELET # BLD AUTO: 334 K/MM3 (ref 130–400)
PMV BLD AUTO: 11.2 FL (ref 7.4–10.4)
POTASSIUM SERPL-SCNC: 4.9 MMOL/L (ref 3.5–5.1)
RBC # BLD AUTO: 5 M/MM3 (ref 4.1–5.3)
SODIUM SERPL-SCNC: 133 MMOL/L (ref 136–145)
WBC # BLD AUTO: 7.2 K/MM3 (ref 4.8–10.8)

## 2021-12-29 NOTE — NUR
Patient awakened for med and assisted onto bedpan. No void. Repositioned up
in bed. Has been resting with eyes closed.

## 2021-12-29 NOTE — NUR
Spoke with Radha regarding Dr. Vargheses recomendations of going to the clinic
for evaluation by Dr. Varghese.  She is asking why they have changed their mind
regarding surgery on Thursday.   This CM called and spoke directly to Dr. Varghese on 12/28/21.  She said that Radha needs to come to the clinic next week
to discuss surgery.  This is not urgent and will likely schedule in the
future.  Dr. Varghese stated that the pain she is reporting is out of context to
the screw placement.  Dr. Varghese stated that this is an elective surgery.

## 2021-12-29 NOTE — NUR
BÁRBARA Osuna in room to speak with patient at this time. Pt alert and
oriented and having appropriate conversation. Pt states pain is significantly
improved after the pain med she received this morning. Pt appears comfortable
at this time but states that she has breakthrough pain between pain meds that
gets pretty bad.

## 2021-12-29 NOTE — NUR
DC INSTRUCTIONS REVIEWED WITH PATIENT. SHE REPORTS  WENT TO [facility]
AFTER HIS VISIT HERE AND SPOKE WITH STAFF REGARDING HOW UPSET HE WAS WITH
WIFE'S CARE PLAN REGARDING HER SURGERY. PATIENT REPORTS HER  AND ORTHO
STAFF DEVISED A PLAN FOR HER TO GO HOME TONIGHT AND THEN UTILIZE AN AMBULANCE
IN THE MORNING TO RETURN TO THE [facility] ER. THERE DR MOORE WOULD BE ABLE TO SEE
HER AND DO A PROCEDURE (MOST LIKELY OUTPATIENT) TO FIX THE LOOSE SCREW IN RT
HIP. PATIENT WAS UNSURE IF THIS WOULD BE DONE AT [facility] OR AT THE SURGICAL
CENTER. ADVISE PATIENT THAT LAST KNOWN BY STAFF, THERE WAS NO BED AVAILABILITY
AT [facility] OR EVEN IN [place] FOR THAT MATTER. PATIENT ALSO REPORTS THAT SHE WAS
REASSURED BY WALKING TO THE BATHROOM EARLIER. STATES "I WAS IN SO MUCH PAIN
BEFORE THAT I JUST STOPPED MOVING AROUND BECAUSE IT HURT. NOW I KNOW I CAN
MOVE AROUND AND WILL DO IT MORE."